# Patient Record
Sex: MALE | Race: BLACK OR AFRICAN AMERICAN | NOT HISPANIC OR LATINO | Employment: UNEMPLOYED | URBAN - METROPOLITAN AREA
[De-identification: names, ages, dates, MRNs, and addresses within clinical notes are randomized per-mention and may not be internally consistent; named-entity substitution may affect disease eponyms.]

---

## 2022-05-03 ENCOUNTER — OFFICE VISIT (OUTPATIENT)
Dept: UROLOGY | Facility: CLINIC | Age: 32
End: 2022-05-03
Payer: COMMERCIAL

## 2022-05-03 VITALS
BODY MASS INDEX: 28.63 KG/M2 | HEIGHT: 70 IN | WEIGHT: 200 LBS | SYSTOLIC BLOOD PRESSURE: 128 MMHG | DIASTOLIC BLOOD PRESSURE: 84 MMHG

## 2022-05-03 DIAGNOSIS — N43.2 OTHER HYDROCELE: Primary | ICD-10-CM

## 2022-05-03 PROCEDURE — 99203 OFFICE O/P NEW LOW 30 MIN: CPT | Performed by: PHYSICIAN ASSISTANT

## 2022-05-03 RX ORDER — IBUPROFEN 600 MG/1
600 TABLET ORAL
COMMUNITY
Start: 2022-04-24

## 2022-05-03 RX ORDER — PERPHENAZINE 16 MG
TABLET ORAL
COMMUNITY

## 2022-05-03 RX ORDER — METHOCARBAMOL 750 MG/1
750 TABLET, FILM COATED ORAL 3 TIMES DAILY PRN
COMMUNITY
Start: 2022-04-24

## 2022-05-03 RX ORDER — DIPHENHYDRAMINE HCL 25 MG
25 CAPSULE ORAL EVERY 6 HOURS PRN
COMMUNITY

## 2022-05-03 RX ORDER — CETIRIZINE HYDROCHLORIDE 10 MG/1
CAPSULE, LIQUID FILLED ORAL
COMMUNITY

## 2022-05-03 RX ORDER — MAGNESIUM GLUCONATE 30 MG(550)
30 TABLET ORAL 2 TIMES DAILY
COMMUNITY

## 2022-05-03 RX ORDER — UBIDECARENONE 50 MG
100 CAPSULE ORAL DAILY
COMMUNITY

## 2022-05-03 NOTE — PROGRESS NOTES
5/3/2022      Chief Complaint   Patient presents with    Hydrocele     Patient was not able to give a urine sample today     Assessment and Plan    28 y o  male new patient     1  Tiny left hydrocele  - US from 4/15/22 showed small left hydrocele, otherwise unremarkable  - Physical exam is unremarkable  - Would not recommend any surgical management at this time  Recommend conservative management including scrotal support / elevation and NSAIDs as needed  - If any severe pain could consider a left spermatic cord block  - Advise patient potentially pain could be musculoskeletal due to lumbar radiculopathy   - F/u in 3-6 months for symptom reassessment  History of Present Illness  Sylwia Hurtado is a 28 y o  male here for new patient evaluation of testicular pain and swelling  He has noticed left testicular swelling for the past several months  He states this began after performing some heavy lifting  He obtain ultrasound which showed a small left hydrocele and otherwise was normal   He recently has been dealing with some lumbar radiculopathy issues and was referred to orthopedist and physical therapy by his PCP  He reports pain is worse when lying flat  Describes the pain as intermittent and is typically no worse than a 4-5/10  He denies any urinary complaints  Review of Systems   Constitutional: Negative for chills and fever  Respiratory: Negative for shortness of breath  Cardiovascular: Negative for chest pain  Gastrointestinal: Negative for abdominal pain  Genitourinary: Positive for scrotal swelling and testicular pain  Negative for difficulty urinating, dysuria, flank pain, frequency, hematuria and urgency  Neurological: Negative for dizziness  Past Medical History  History reviewed  No pertinent past medical history  Past Social History  History reviewed  No pertinent surgical history    Social History     Tobacco Use   Smoking Status Never Smoker   Smokeless Tobacco Never Used Past Family History  History reviewed  No pertinent family history  Past Social history  Social History     Socioeconomic History    Marital status: Single     Spouse name: Not on file    Number of children: Not on file    Years of education: Not on file    Highest education level: Not on file   Occupational History    Not on file   Tobacco Use    Smoking status: Never Smoker    Smokeless tobacco: Never Used   Vaping Use    Vaping Use: Some days   Substance and Sexual Activity    Alcohol use: Not on file    Drug use:  Yes    Sexual activity: Not on file   Other Topics Concern    Not on file   Social History Narrative    Not on file     Social Determinants of Health     Financial Resource Strain: Not on file   Food Insecurity: Not on file   Transportation Needs: Not on file   Physical Activity: Not on file   Stress: Not on file   Social Connections: Not on file   Intimate Partner Violence: Not on file   Housing Stability: Not on file       Current Medications  Current Outpatient Medications   Medication Sig Dispense Refill    ibuprofen (MOTRIN) 600 mg tablet Take 600 mg by mouth 3 (three) times daily after meals      methocarbamol (ROBAXIN) 750 mg tablet Take 750 mg by mouth 3 (three) times a day as needed      Alpha-Lipoic Acid 600 MG CAPS Take by mouth (Patient not taking: Reported on 5/3/2022 )      Cetirizine HCl (ZyrTEC Allergy) 10 MG CAPS  (Patient not taking: Reported on 5/3/2022 )      Cholecalciferol 50 MCG (2000 UT) CAPS Take 1 capsule by mouth (Patient not taking: Reported on 5/3/2022 )      Coenzyme Q10 50 MG CAPS Take 100 mg by mouth daily (Patient not taking: Reported on 5/3/2022 )      diphenhydrAMINE (BENADRYL) 25 mg capsule Take 25 mg by mouth every 6 (six) hours as needed (Patient not taking: Reported on 5/3/2022 )      Docosahexaenoic Acid (DHA OMEGA 3 PO)  (Patient not taking: Reported on 5/3/2022 )      Magnesium Gluconate 550 MG TABS Take 30 mg by mouth 2 (two) times a day (Patient not taking: Reported on 5/3/2022 )      Saw Palmetto, Serenoa repens, (SAW PALMETTO PO) Take by mouth (Patient not taking: Reported on 5/3/2022 )       No current facility-administered medications for this visit  Allergies  Allergies   Allergen Reactions    Castor Oil Angioedema    Penicillins Other (See Comments)     Patient unsure - he was a child when he had a reaction          The following portions of the patient's history were reviewed and updated as appropriate: allergies, current medications, past medical history, past social history, past surgical history and problem list       Vitals  Vitals:    05/03/22 1301   BP: 128/84   BP Location: Left arm   Patient Position: Sitting   Cuff Size: Adult   Weight: 90 7 kg (200 lb)   Height: 5' 10" (1 778 m)           Physical Exam  Physical Exam  Constitutional:       Appearance: Normal appearance  HENT:      Head: Normocephalic and atraumatic  Right Ear: External ear normal       Left Ear: External ear normal    Eyes:      General: No scleral icterus  Conjunctiva/sclera: Conjunctivae normal    Cardiovascular:      Pulses: Normal pulses  Pulmonary:      Effort: Pulmonary effort is normal    Genitourinary:     Penis: Normal        Comments: Testes descended and symmetric  No scrotal erythema or swelling  No obvious hydrocele appreciated on exam  Some diffuse tenderness to left testicle  No intratesticular masses  Musculoskeletal:         General: Normal range of motion  Cervical back: Normal range of motion  Skin:     General: Skin is warm and dry  Neurological:      General: No focal deficit present  Mental Status: He is alert and oriented to person, place, and time  Psychiatric:         Mood and Affect: Mood normal          Behavior: Behavior normal          Thought Content:  Thought content normal          Judgment: Judgment normal            Results  No results found for this or any previous visit (from the past 1 hour(s))  ]  No results found for: PSA  No results found for: GLUCOSE, CALCIUM, NA, K, CO2, CL, BUN, CREATININE  No results found for: WBC, HGB, HCT, MCV, PLT        Orders  No orders of the defined types were placed in this encounter        Gaye Kelly PA-C

## 2024-08-14 ENCOUNTER — OFFICE VISIT (OUTPATIENT)
Age: 34
End: 2024-08-14
Payer: COMMERCIAL

## 2024-08-14 VITALS
HEIGHT: 70 IN | HEART RATE: 68 BPM | OXYGEN SATURATION: 96 % | SYSTOLIC BLOOD PRESSURE: 132 MMHG | DIASTOLIC BLOOD PRESSURE: 84 MMHG | TEMPERATURE: 98.4 F | WEIGHT: 228 LBS | BODY MASS INDEX: 32.64 KG/M2

## 2024-08-14 DIAGNOSIS — L63.9 ALOPECIA AREATA: Primary | ICD-10-CM

## 2024-08-14 PROCEDURE — 99204 OFFICE O/P NEW MOD 45 MIN: CPT | Performed by: DERMATOLOGY

## 2024-08-14 RX ORDER — CLOBETASOL PROPIONATE 0.5 MG/G
AEROSOL, FOAM TOPICAL
Qty: 100 G | Refills: 3 | Status: SHIPPED | OUTPATIENT
Start: 2024-08-14

## 2024-08-14 NOTE — PATIENT INSTRUCTIONS
"ALOPECIA AREATA    Additional History of Present Condition:  Patient presents for hair loss since he was 26. Patient states he does have a family history of Thyroid disease  Negative impact on psychosocial functioning? No  Rapid onset?  No  Preceding illness/viral URI?  No  Personal history of atopy/eczema?  No  Personal or family history of thyroid disorders, atopy/eczema, vitiligo, psoriasis, diabetes mellitus?  yes      Plan:  Discussed that alopecia areata is an autoimmune condition where the immune cells in the body attack the hair follicles, causing hair loss. Alopecia areata may be associated with other autoimmune conditions, including vitiligo and thyroid disease. Workup may include labs to check for specific antibodies and thyroid function.  Discussed potential course of the condition, including potential for hair to grow back on its own. Continue to monitor for changes, including increased patch size or more patches of hair loss developing. Although spontaneous hair regrowth may occur, relapse is common.  Educated that 50% of patients with limited hair loss will recover within a year but many will experience multiple episodes.   Certain patterns of alopecia areata, such as \"ophiasis\" pattern, may be associated with worse clinical outcomes. Discussed that rarely alopecia areata can progress to alopecia totalis (complete loss of hair on the head) or alopecia universalis (complete loss of hair on the scalp and body).   PRESCRIPTION MANAGEMENT:  We discussed that treatment often begins with topical steroids and topical calcineurin inhibitors; topical DELMER-inhibitors may also be useful.  Systemic therapy with oral corticosteroids such as prednisone or DELMER-inhibitors may also be indicated.  Side effects of these medications were discussed.  Tpoical Clobetasol 0.5% foam daily     PROCEDURES PERFORMED TODAY ASSOCIATED WITH THIS CONDITION:          NONE.      MEDICAL DECISION MAKING  Treatment Goal:  Resolution of " the CHRONIC condition.       Chronic condition is NOT at treatment goal.  It is progressing along its expected course OR is poorly-controlled.

## 2024-08-14 NOTE — PROGRESS NOTES
"[facility] Note     Patient Name: Montrell Love  Encounter Date: 8/14/24     Have you been cared for by a [facility]ologist in the last 3 years and, if so, which description applies to you?    NO.   I am considered a \"new\" patient and must complete all patient intake questions. I am MALE/not capable of bearing children.    REVIEW OF SYSTEMS:  Have you recently had or currently have any of the following? Recent fever or chills? No  Any non-healing wound? No   PAST MEDICAL HISTORY:  Have you personally ever had or currently have any of the following?  If \"YES,\" then please provide more detail. Skin cancer (such as Melanoma, Basal Cell Carcinoma, Squamous Cell Carcinoma?  No  Tuberculosis, HIV/AIDS, Hepatitis B or C: No  Radiation Treatment No   HISTORY OF IMMUNOSUPPRESSION:   Do you have a history of any of the following:  Systemic Immunosuppression such as Diabetes, Biologic or Immunotherapy, Chemotherapy, Organ Transplantation, Bone Marrow Transplantation?  No     Answering \"YES\" requires the addition of the dotphrase \"IMMUNOSUPPRESSED\" as the first diagnosis of the patient's visit.   FAMILY HISTORY:  Any \"first degree relatives\" (parent, brother, sister, or child) with the following?    Skin Cancer, Pancreatic or Other Cancer? No   PATIENT EXPERIENCE:    Do you want the Dermatologist to perform a COMPLETE skin exam today including a clinical examination under the \"bra and underwear\" areas?  yes  If necessary, do we have your permission to call and leave a detailed message on your Preferred Phone number that includes your specific medical information?  Yes      Allergies   Allergen Reactions    Castor Oil Angioedema    Penicillins Other (See Comments)     Patient unsure - he was a child when he had a reaction       Current Outpatient Medications:     ibuprofen (MOTRIN) 600 mg tablet, Take 600 mg by mouth 3 (three) times daily after meals, Disp: , Rfl:     methocarbamol (ROBAXIN) 750 mg tablet, " Take 750 mg by mouth 3 (three) times a day as needed, Disp: , Rfl:     NON FORMULARY, Formulation: Dispensary Name: Dispensary Phone Number:, Disp: , Rfl:     Zn-Pyg Afri-Nettle-Saw Palmet (SAW PALMETTO COMPLEX PO), Take 1 tablet by mouth daily, Disp: , Rfl:     Alpha-Lipoic Acid 600 MG CAPS, Take by mouth (Patient not taking: Reported on 5/3/2022), Disp: , Rfl:     Cetirizine HCl (ZyrTEC Allergy) 10 MG CAPS, , Disp: , Rfl:     Cholecalciferol 50 MCG (2000 UT) CAPS, Take 1 capsule by mouth (Patient not taking: Reported on 5/3/2022), Disp: , Rfl:     Coenzyme Q10 50 MG CAPS, Take 100 mg by mouth daily (Patient not taking: Reported on 5/3/2022), Disp: , Rfl:     diphenhydrAMINE (BENADRYL) 25 mg capsule, Take 25 mg by mouth every 6 (six) hours as needed (Patient not taking: Reported on 5/3/2022), Disp: , Rfl:     Docosahexaenoic Acid (DHA OMEGA 3 PO), , Disp: , Rfl:     Magnesium Gluconate 550 MG TABS, Take 30 mg by mouth 2 (two) times a day (Patient not taking: Reported on 5/3/2022), Disp: , Rfl:     Saw Palmetto, Serenoa repens, (SAW PALMETTO PO), Take by mouth (Patient not taking: Reported on 5/3/2022), Disp: , Rfl:           Whom besides the patient is providing clinical information about today's encounter?   NO ADDITIONAL HISTORIAN (patient alone provided history)    Physical Exam and Assessment/Plan by Diagnosis:      ALOPECIA AREATA    Physical Exam:  Anatomic Location: Scalp  Morphologic Description:  Well-demarcated round-oval patch of hair loss  Severity (SALT SCORE):   MILD:  1-20% hair loss of affected areas  Diffuse (multifocal) positive hair pull test consistent with rapidly progressive AA? No  Pertinent Positives:  Exclamation point hairs present under dermoscopy (especially at periphery): YES  Ophiasis pattern? No  Eyebrow and/or eyelash involvement?  No  Enlarged thyroid or nodules palpated? No  Nail pitting or ridging: No  Pertinent Negatives:         Additional History of Present Condition:  Patient  "presents for hair loss since he was 26. Patient states he does have a family history of Thyroid disease  Negative impact on psychosocial functioning? No  Rapid onset?  No  Preceding illness/viral URI?  No  Personal history of atopy/eczema?  No  Personal or family history of thyroid disorders, atopy/eczema, vitiligo, psoriasis, diabetes mellitus?  yes      Plan:  Discussed that alopecia areata is an autoimmune condition where the immune cells in the body attack the hair follicles, causing hair loss. Alopecia areata may be associated with other autoimmune conditions, including vitiligo and thyroid disease. Workup may include labs to check for specific antibodies and thyroid function.  Discussed potential course of the condition, including potential for hair to grow back on its own. Continue to monitor for changes, including increased patch size or more patches of hair loss developing. Although spontaneous hair regrowth may occur, relapse is common.  Educated that 50% of patients with limited hair loss will recover within a year but many will experience multiple episodes.   Certain patterns of alopecia areata, such as \"ophiasis\" pattern, may be associated with worse clinical outcomes. Discussed that rarely alopecia areata can progress to alopecia totalis (complete loss of hair on the head) or alopecia universalis (complete loss of hair on the scalp and body).   PRESCRIPTION MANAGEMENT:  We discussed that treatment often begins with topical steroids and topical calcineurin inhibitors; topical DELMER-inhibitors may also be useful.  Systemic therapy with oral corticosteroids such as prednisone or DELMER-inhibitors may also be indicated.  Side effects of these medications were discussed.  Tpoical Clobetasol 0.5% foam daily     PROCEDURES PERFORMED TODAY ASSOCIATED WITH THIS CONDITION:          NONE.      MEDICAL DECISION MAKING  Treatment Goal:  Resolution of the CHRONIC condition.       Chronic condition is NOT at treatment " goal.  It is progressing along its expected course OR is poorly-controlled.              Scribe Attestation      I,:  Anali Álvarez MA am acting as a scribe while in the presence of the attending physician.:       I,:  Maninder Frias MD personally performed the services described in this documentation    as scribed in my presence.:

## 2025-01-09 ENCOUNTER — OFFICE VISIT (OUTPATIENT)
Age: 35
End: 2025-01-09
Payer: COMMERCIAL

## 2025-01-09 VITALS
HEIGHT: 70 IN | DIASTOLIC BLOOD PRESSURE: 80 MMHG | TEMPERATURE: 98.7 F | WEIGHT: 225 LBS | HEART RATE: 93 BPM | SYSTOLIC BLOOD PRESSURE: 124 MMHG | BODY MASS INDEX: 32.21 KG/M2 | OXYGEN SATURATION: 94 %

## 2025-01-09 DIAGNOSIS — L63.9 ALOPECIA AREATA: Primary | ICD-10-CM

## 2025-01-09 DIAGNOSIS — L85.8 KERATOSIS PILARIS: ICD-10-CM

## 2025-01-09 DIAGNOSIS — L28.2: ICD-10-CM

## 2025-01-09 DIAGNOSIS — B35.3 TINEA PEDIS OF BOTH FEET: ICD-10-CM

## 2025-01-09 PROCEDURE — 11900 INJECT SKIN LESIONS </W 7: CPT | Performed by: STUDENT IN AN ORGANIZED HEALTH CARE EDUCATION/TRAINING PROGRAM

## 2025-01-09 PROCEDURE — 99214 OFFICE O/P EST MOD 30 MIN: CPT | Performed by: STUDENT IN AN ORGANIZED HEALTH CARE EDUCATION/TRAINING PROGRAM

## 2025-01-09 RX ORDER — DOXYCYCLINE 100 MG/1
100 CAPSULE ORAL EVERY 12 HOURS SCHEDULED
Qty: 60 CAPSULE | Refills: 0 | Status: SHIPPED | OUTPATIENT
Start: 2025-01-09 | End: 2025-02-08

## 2025-01-09 RX ORDER — CICLOPIROX OLAMINE 7.7 MG/G
CREAM TOPICAL 2 TIMES DAILY
Qty: 90 G | Refills: 1 | Status: SHIPPED | OUTPATIENT
Start: 2025-01-09

## 2025-01-09 RX ORDER — CLOBETASOL PROPIONATE 0.5 MG/G
AEROSOL, FOAM TOPICAL
Qty: 100 G | Refills: 3 | Status: SHIPPED | OUTPATIENT
Start: 2025-01-09

## 2025-01-09 NOTE — PATIENT INSTRUCTIONS
ALOPECIA AREATA    Physical Exam:  Anatomic Location Affected:  Scalp  Morphological Description:  patches of hair loss    It may be characterized by one or more of the following signs and symptoms:  Patchy hair loss that begins in one or more coin-sized areas on the scalp  A few short hairs that occur in or at the edges of the bare spots that get narrower at the bottom like an exclamation mary ann  Clumps of hair on the pillow or in the shower   Hair loss can occur mostly on the scalp, but can involve eyebrows, eyelashes, and beards  Nail problems such as pitting, white spots, or lines. Rarely, the nails can change shape or fall off    Diagnosis of alopecia areata can usually be made with a good history and careful physical exam. Most patients with alopecia areata are otherwise healthy; screening with blood work may be warranted for those with specific symptoms such as thyroid dysfunction.  The dermatologist will sometimes perform a skin biopsy to confirm the diagnosis.     When alopecia areata occurs in a child, it is important to to give proper support and education about the condition. Providing your child with positive messages and praise is crucial for self-esteem. In many cases, alopecia areata will resolve by itself over the course of several months. Many patients lose their hair more than once before the condition goes away on its own. No one can predict when hair might re-grow or fall out again. Treatment can help the hair regrow more quickly.     Some specific treatment options include:  Corticosteroids in the form of strong topical creams and ointments. These are applied to bare spots and surrounding skin. Steroid injections work better than topical medciations and may be used in older children who can tolerate needles.   Patients receive shots ever 3 to 6 weeks, with hair growth generally beginning 4 weeks after the last shot  Minoxidil 5% is a hair regrowth medicine that is applied twice a day to the  scalp, brows, or beard. It generally takes about 3 months to begin working.  Anthralin is a tar-like substance that alters the skin's immune function. It is left on the skin for 20 to 60 minutes and then washed off to avoid irritation.  Diphencyprone can be used when other treatments fail. It causes a small allergic reaction when applied to bare skin, which tricks the immune system into fighting the inflammation on the scalp. This method can take up to three months for the hair to start re-growing.     KERATOSIS PILARIS    Physical Exam:  Anatomic Location Affected: Bilateral Legs  Assessment and Plan:  Based on a thorough discussion of this condition and the management approach to it (including a comprehensive discussion of the known risks, side effects and potential benefits of treatment), the patient (family) agrees to implement the following specific plan:  Advised Over the counter Amlactin Cream or Salicylic Acid Cream    Keratosis pilaris is a very common condition that appears as tiny bumps on the skin that may look like goosebumps or small pimples. These bumps are composed of small plugs of dead skin cells and are most commonly found over the upper arms and thighs. Children may also find bumps on their cheeks. Keratosis pilaris is harmless and affects up to half of normal children and up to three quarters of children who have ichthyosis vulgaris (a dry skin condition due to filaggrin gene mutations). It is also more common in children with atopic eczema.     Common symptoms of keratosis pilaris begin before age 2 or during the teenage years.    Bumps over the outer aspect of upper arms and thighs symmetrically that feel like sandpaper  Potentially over buttocks, sides of cheeks, forearms, and upper back  Scaly, skin colored or red spots in keratosis pilaris rubra  Non-painful, but potential to be itchy     Keratosis pilaris is caused by abnormal growth of skin cells lining the upper portion of the hair  "follicles. Instead of naturally sloughing off, scaly skin will pile up and fill the follicle. There is a strong association with genetics, meaning that the condition has a high chance of being inherited if one or both parents are affected. It can also occur as a side effect of cancer therapies such as vemurafenib.     Treating dry skin often helps as dry skin can cause the bumps to be more noticeable. Many people notice that the bumps disappear in the summer months when there is more moisture in the air. Sometimes, keratosis pilaris fades as one grows older, but puberty and hormonal therapy can cause flare-ups. If itch, dryness, or appearance bother you, treatment options include:  Using non-soap cleansers to minimize dryness of the skin   Exfoliation in the shower using a pumice stone or exfoliating sponge  Ammonium lactate cream or lotion (12%)   A moisturizing cream or ointment applied at least 2-3x a day and after bathing   Creams containing urea or lactic acid are especially helpful   Other medicines that remove dead skin cells can also be effective   Alpha hydroxyl acid  Glycolic acid  Retinoids (adapalene, retinol, tazarotene, trentinoin)  Salicylic acid  Pulse dye laser treatments or intense pulsed light can reduce redness temporarily, but not roughness   Laser assisted hair removal       TINEA PEDIS (\"ATHLETE'S FOOT\")    Physical Exam:  Anatomic Location Affected:  Toe web spaces  Morphological Description:  maceration     Assessment and Plan:  Based on a thorough discussion of this condition and the management approach to it (including a comprehensive discussion of the known risks, side effects and potential benefits of treatment), the patient (family) agrees to implement the following specific plan:  Ciclopirox Cream apply to feet twice a day    Tinea Pedis  Tinea pedis is a fungal infection of the foot and is in fact the most common fungal infection. Tinea pedis is caused by dermatophyte fungi with the " "three most common being Trichophyton (T.) rubrum, T. interdigitale and Epidermophyton floccosum.    Tinea pedis most commonly involves the interdigital spaces, known as \"athlete's foot.\" Other typical sites include the toenails, groin, and palms of the hands.  There are four major manifestations of tinea pedis including chronic hyperkeratotic, chronic intertriginous, acute ulcerative and vesicobullous. Signs and symptoms include:   Itchiness, redness, and scaling between the toes  Scales covering the soles and sides of the feet  Blisters over the inner aspect of the feet    It is particularly common in hot, tropical, and urban environments where sweating in the feet facilitate fungal growth. Risk factors for development include:  Occlusive footwear  Excessive swearing  Diabetes or other underlying immunosuppression   Poor peripheral circulation     The diagnosis of tinea pedis can usually be made via good history and physical exam due to its characteristic clinical features. Diagnosis can be confirmed by examining skin scrapings under the microscope. Cultures are occasionally done but may not be necessary if fungi are seen under microscopy.     Other diagnoses to consider if patients do not respond to therapy include psoriasis, contact dermatitis, and eczema.    Tinea pedis can be treated with topical antifungal drugs applied to affected areas on a repeated basis (usually 2 twice a day) for 2 to 4 weeks. Common topical medications include topical ketoconazole, allylamines, butenafine, ciclopirox, and tolnaftate.  In cases that do not respond to topical therapy, oral antifungal agents may be used which include terbinafine, itraconazole, fluconazole and griseofulvin. These oral agents are also used to treat tinea capitis (fungal infection of the scalp) and onychomycosis (fungal infection of the nails).  Those with pre-existing liver problems are usually screened for liver function prior to starting oral terbinafine. "     Tinea pedis can be prevented by making sure feet are clean and dry with protective footwear worn in communal facilities. Other recommendations are:  Using drying foot powders when wearing occlusive shoes   Thoroughly dry shoes and boots prior to wearing them   Making sure to clean contaminated bathroom floors with bleach   Treatment of family members and other close contacts    PRURIGO PIGMENTOSA     Physical Exam:  (Anatomic Location); (Size and Morphological Description); (Differential Diagnosis):  Back; hyperpigmented nodules  Pertinent Positives:  Pertinent Negatives:     Additional History of Present Condition:  Patient follows Keto diet     Assessment and Plan:  Based on a thorough discussion of this condition and the management approach to it (including a comprehensive discussion of the known risks, side effects and potential benefits of treatment), the patient (family) agrees to implement the following specific plan:  Doxycycline 100mg twice daily x3 months

## 2025-01-09 NOTE — PROGRESS NOTES
"[facility] Dermatology Clinic Note     Patient Name: Montrell Love  Encounter Date: January 9, 2025     Have you been cared for by a [facility] Dermatologist in the last 3 years and, if so, which description applies to you?    Yes.  I have been here within the last 3 years, and my medical history has NOT changed since that time.  I am MALE/not capable of bearing children.    REVIEW OF SYSTEMS:  Have you recently had or currently have any of the following? No changes in my recent health.   PAST MEDICAL HISTORY:  Have you personally ever had or currently have any of the following?  If \"YES,\" then please provide more detail. No changes in my medical history.   HISTORY OF IMMUNOSUPPRESSION: Do you have a history of any of the following:  Systemic Immunosuppression such as Diabetes, Biologic or Immunotherapy, Chemotherapy, Organ Transplantation, Bone Marrow Transplantation or Prednisone?  No     Answering \"YES\" requires the addition of the dotphrase \"IMMUNOSUPPRESSED\" as the first diagnosis of the patient's visit.   FAMILY HISTORY:  Any \"first degree relatives\" (parent, brother, sister, or child) with the following?    No changes in my family's known health.   PATIENT EXPERIENCE:    Do you want the Dermatologist to perform a COMPLETE skin exam today including a clinical examination under the \"bra and underwear\" areas?  NO  If necessary, do we have your permission to call and leave a detailed message on your Preferred Phone number that includes your specific medical information?  Yes      Allergies   Allergen Reactions   • Castor Oil Angioedema   • Penicillins Other (See Comments)     Patient unsure - he was a child when he had a reaction       Current Outpatient Medications:   •  Alpha-Lipoic Acid 600 MG CAPS, Take by mouth, Disp: , Rfl:   •  Cetirizine HCl (ZyrTEC Allergy) 10 MG CAPS, , Disp: , Rfl:   •  Cholecalciferol 50 MCG (2000 UT) CAPS, Take 1 capsule by mouth, Disp: , Rfl:   •  ciclopirox (LOPROX) 0.77 % cream, " Apply topically 2 (two) times a day In between toes., Disp: 90 g, Rfl: 1  •  clobetasol (OLUX) 0.05 % topical foam, Apply once daily to scalp every other day to areas of hair loss., Disp: 100 g, Rfl: 3  •  Coenzyme Q10 50 MG CAPS, Take 100 mg by mouth daily, Disp: , Rfl:   •  diphenhydrAMINE (BENADRYL) 25 mg capsule, Take 25 mg by mouth every 6 (six) hours as needed, Disp: , Rfl:   •  Docosahexaenoic Acid (DHA OMEGA 3 PO), , Disp: , Rfl:   •  doxycycline hyclate (VIBRAMYCIN) 100 mg capsule, Take 1 capsule (100 mg total) by mouth every 12 (twelve) hours, Disp: 60 capsule, Rfl: 0  •  ibuprofen (MOTRIN) 600 mg tablet, Take 600 mg by mouth 3 (three) times daily after meals, Disp: , Rfl:   •  Magnesium Gluconate 550 MG TABS, Take 30 mg by mouth 2 (two) times a day, Disp: , Rfl:   •  methocarbamol (ROBAXIN) 750 mg tablet, Take 750 mg by mouth 3 (three) times a day as needed, Disp: , Rfl:   •  NON FORMULARY, Formulation: Dispensary Name: Dispensary Phone Number:, Disp: , Rfl:   •  Saw Palmetto, Serenoa repens, (SAW PALMETTO PO), Take by mouth, Disp: , Rfl:   •  Zn-Pyg Afri-Nettle-Saw Palmet (SAW PALMETTO COMPLEX PO), Take 1 tablet by mouth daily, Disp: , Rfl:   No current facility-administered medications for this visit.          Whom besides the patient is providing clinical information about today's encounter?   NO ADDITIONAL HISTORIAN (patient alone provided history)    Physical Exam and Assessment/Plan by Diagnosis:    Chief complaint: Patient is a 33 y/o male present for follow up of Alopecia Areata on the Scalp, was using Clobetasol Foam which seemed to be helping but finished refills. He also was using a Onion and Coconut Oil puree for the scalp. (2) dark spots on the back, chest and legs. Pt states spots on back started after intercourse with someone and Urgent care did not think it was any kind of STD. (3) white patches between toe web spaces which started 4 months ago.    ALOPECIA AREATA    Physical  Exam:  Anatomic Location Affected:  Scalp  Morphological Description:  focal patches of non-scarring hair loss  Pertinent Positives:  Pertinent Negatives:    Additional History of Present Condition:  completed 3 month course of Clobetasol Foam. Patient noted improvement with topicals but ran out of medication. His mom also had this condition and had success with intralesional kenalog injections. Patient is asking about intralesional kenalog to treat his AA.     Assessment and Plan:  Based on a thorough discussion of this condition and the management approach to it (including a comprehensive discussion of the known risks, side effects and potential benefits of treatment), the patient (family) agrees to implement the following specific plan:  Continue Clobetasol Foam daily on scalp  PROCEDURE:  INTRALESIONAL STEROID INJECTION (KENALOG INJECTION)    Purpose: Triamcinolone is a synthetic glucocorticoid corticosteroid that has marked anti-inflammatory action. It is prepared in sterile aqueous suspension suitable for injecting directly into a lesion on or immediately below the skin to treat a dermal inflammatory process.     Indications: It is indicated for alopecia areata; inflammatory acne cysts; discoid lupus erythematosus; keloids and hypertrophic scars; inflammatory lesions of granuloma annulare, lichen planus, lichen simplex chronicus (neurodermatitis), psoriatic plaques, and other localized inflammatory skin conditions.     Potential Side Effects: I understand that triamcinolone injection can potentially cause early and/or delayed adverse effects such as:   • Pain   • Impaired wound healing   • Increased hair growth   • Bleeding   • White or brown marks   • Steroid acne   • Infection   • Telangiectasia   • Skin thinning   • Cutaneous and subcutaneous lipoatrophy (most common) appearing as skin indentations or dimples around the injection sites a few weeks after treatment     PROCEDURE NOTE:  After verbal and  written consent were obtained, the to-be-treated area was wiped and cleaned with rubbing alcohol 70%.      Then, a total of 2.0 mL of Kenalog CONCENTRATION:  10 mg/mL   (Lot# [id number]; Expiration 12/30/2025, NDC#: 4160-5987-41) was injected intralesionally into a total of 4 patches on the following anatomic areas:  Front, Mid and Posterior Scalp all Midline,  using a 1-mL syringe and a 30 1/2-gauge needle.      There was less than 1 mL of blood loss and little to no discomfort.  The area was bandaged with a Band-aid.  The patient tolerated the procedure well and remained in the office for observation.  With no signs of an adverse reaction, the patient was eventually discharged from clinic.        It may be characterized by one or more of the following signs and symptoms:  Patchy hair loss that begins in one or more coin-sized areas on the scalp  A few short hairs that occur in or at the edges of the bare spots that get narrower at the bottom like an exclamation mary ann  Clumps of hair on the pillow or in the shower   Hair loss can occur mostly on the scalp, but can involve eyebrows, eyelashes, and beards  Nail problems such as pitting, white spots, or lines. Rarely, the nails can change shape or fall off    Diagnosis of alopecia areata can usually be made with a good history and careful physical exam. Most patients with alopecia areata are otherwise healthy; screening with blood work may be warranted for those with specific symptoms such as thyroid dysfunction.  The dermatologist will sometimes perform a skin biopsy to confirm the diagnosis.     When alopecia areata occurs in a child, it is important to to give proper support and education about the condition. Providing your child with positive messages and praise is crucial for self-esteem. In many cases, alopecia areata will resolve by itself over the course of several months. Many patients lose their hair more than once before the condition goes away on its own. No  one can predict when hair might re-grow or fall out again. Treatment can help the hair regrow more quickly.     Some specific treatment options include:  Corticosteroids in the form of strong topical creams and ointments. These are applied to bare spots and surrounding skin. Steroid injections work better than topical medciations and may be used in older children who can tolerate needles.   Patients receive shots ever 3 to 6 weeks, with hair growth generally beginning 4 weeks after the last shot  Minoxidil 5% is a hair regrowth medicine that is applied twice a day to the scalp, brows, or beard. It generally takes about 3 months to begin working.  Anthralin is a tar-like substance that alters the skin's immune function. It is left on the skin for 20 to 60 minutes and then washed off to avoid irritation.  Diphencyprone can be used when other treatments fail. It causes a small allergic reaction when applied to bare skin, which tricks the immune system into fighting the inflammation on the scalp. This method can take up to three months for the hair to start re-growing.     KERATOSIS PILARIS    Physical Exam:  Anatomic Location Affected: Bilateral Legs  Morphological Description:  1-2mm sumeet-follicular pinkish-red papules   Pertinent Positives:  Pertinent Negatives:    Additional History of Present Condition:  present on exam    Assessment and Plan:  Based on a thorough discussion of this condition and the management approach to it (including a comprehensive discussion of the known risks, side effects and potential benefits of treatment), the patient (family) agrees to implement the following specific plan:  Advised Over the counter Amlactin Cream or Salicylic Acid Cream    Keratosis pilaris is a very common condition that appears as tiny bumps on the skin that may look like goosebumps or small pimples. These bumps are composed of small plugs of dead skin cells and are most commonly found over the upper arms and thighs.  Children may also find bumps on their cheeks. Keratosis pilaris is harmless and affects up to half of normal children and up to three quarters of children who have ichthyosis vulgaris (a dry skin condition due to filaggrin gene mutations). It is also more common in children with atopic eczema.     Common symptoms of keratosis pilaris begin before age 2 or during the teenage years.    Bumps over the outer aspect of upper arms and thighs symmetrically that feel like sandpaper  Potentially over buttocks, sides of cheeks, forearms, and upper back  Scaly, skin colored or red spots in keratosis pilaris rubra  Non-painful, but potential to be itchy     Keratosis pilaris is caused by abnormal growth of skin cells lining the upper portion of the hair follicles. Instead of naturally sloughing off, scaly skin will pile up and fill the follicle. There is a strong association with genetics, meaning that the condition has a high chance of being inherited if one or both parents are affected. It can also occur as a side effect of cancer therapies such as vemurafenib.     Treating dry skin often helps as dry skin can cause the bumps to be more noticeable. Many people notice that the bumps disappear in the summer months when there is more moisture in the air. Sometimes, keratosis pilaris fades as one grows older, but puberty and hormonal therapy can cause flare-ups. If itch, dryness, or appearance bother you, treatment options include:  Using non-soap cleansers to minimize dryness of the skin   Exfoliation in the shower using a pumice stone or exfoliating sponge  Ammonium lactate cream or lotion (12%)   A moisturizing cream or ointment applied at least 2-3x a day and after bathing   Creams containing urea or lactic acid are especially helpful   Other medicines that remove dead skin cells can also be effective   Alpha hydroxyl acid  Glycolic acid  Retinoids (adapalene, retinol, tazarotene, trentinoin)  Salicylic acid  Pulse dye laser  "treatments or intense pulsed light can reduce redness temporarily, but not roughness   Laser assisted hair removal     TINEA PEDIS (\"ATHLETE'S FOOT\")    Physical Exam:  Anatomic Location Affected:  Toe web spaces  Morphological Description:  macerated plaques   Pertinent Positives:  Pertinent Negatives:    Additional History of Present Condition:  present on exam    Assessment and Plan:  Based on a thorough discussion of this condition and the management approach to it (including a comprehensive discussion of the known risks, side effects and potential benefits of treatment), the patient (family) agrees to implement the following specific plan:  Ciclopirox Cream apply to feet twice a day    Tinea Pedis  Tinea pedis is a fungal infection of the foot and is in fact the most common fungal infection. Tinea pedis is caused by dermatophyte fungi with the three most common being Trichophyton (T.) rubrum, T. interdigitale and Epidermophyton floccosum.    Tinea pedis most commonly involves the interdigital spaces, known as \"athlete's foot.\" Other typical sites include the toenails, groin, and palms of the hands.  There are four major manifestations of tinea pedis including chronic hyperkeratotic, chronic intertriginous, acute ulcerative and vesicobullous. Signs and symptoms include:   Itchiness, redness, and scaling between the toes  Scales covering the soles and sides of the feet  Blisters over the inner aspect of the feet    It is particularly common in hot, tropical, and urban environments where sweating in the feet facilitate fungal growth. Risk factors for development include:  Occlusive footwear  Excessive swearing  Diabetes or other underlying immunosuppression   Poor peripheral circulation     The diagnosis of tinea pedis can usually be made via good history and physical exam due to its characteristic clinical features. Diagnosis can be confirmed by examining skin scrapings under the microscope. Cultures are " occasionally done but may not be necessary if fungi are seen under microscopy.     Other diagnoses to consider if patients do not respond to therapy include psoriasis, contact dermatitis, and eczema.    Tinea pedis can be treated with topical antifungal drugs applied to affected areas on a repeated basis (usually 2 twice a day) for 2 to 4 weeks. Common topical medications include topical ketoconazole, allylamines, butenafine, ciclopirox, and tolnaftate.  In cases that do not respond to topical therapy, oral antifungal agents may be used which include terbinafine, itraconazole, fluconazole and griseofulvin. These oral agents are also used to treat tinea capitis (fungal infection of the scalp) and onychomycosis (fungal infection of the nails).  Those with pre-existing liver problems are usually screened for liver function prior to starting oral terbinafine.     Tinea pedis can be prevented by making sure feet are clean and dry with protective footwear worn in communal facilities. Other recommendations are:  Using drying foot powders when wearing occlusive shoes   Thoroughly dry shoes and boots prior to wearing them   Making sure to clean contaminated bathroom floors with bleach   Treatment of family members and other close contacts    PRURIGO PIGMENTOSA    Physical Exam:  (Anatomic Location); (Size and Morphological Description); (Differential Diagnosis):  Back; hyperpigmented nodules  Pertinent Positives:  Pertinent Negatives:    Additional History of Present Condition:  Patient follows Keto diet    Assessment and Plan:  Based on a thorough discussion of this condition and the management approach to it (including a comprehensive discussion of the known risks, side effects and potential benefits of treatment), the patient (family) agrees to implement the following specific plan:  Doxycycline 100mg twice daily for 1 month     Scribe Attestation    I,:  Margie Raines MA am acting as a scribe while in the presence of  the attending physician.:       I,:  Cali Finley DO personally performed the services described in this documentation    as scribed in my presence.:

## 2025-02-05 ENCOUNTER — TELEPHONE (OUTPATIENT)
Age: 35
End: 2025-02-05

## 2025-02-05 NOTE — TELEPHONE ENCOUNTER
Pt calling in requesting itemized bill for visit. Encouraged pt to contact billing at [phone]. No other questions at this time.

## 2025-04-17 ENCOUNTER — TELEPHONE (OUTPATIENT)
Age: 35
End: 2025-04-17

## 2025-04-17 NOTE — TELEPHONE ENCOUNTER
Patient called asking if we can waive the co pay for his today's apt.   Teams sent to [facility]/no reply within the given time to keep patient on hold.  Please look into this and advise.   Patient stated he will call back at 1 and asked for no one to call him before 1 please.  Thank you

## 2025-04-17 NOTE — TELEPHONE ENCOUNTER
"[facility] advised Via Teams:   \"The provider does not have the ability to waive copayment that is an agreement patient has with his insurance company. I think technically it is against the law. IF they cannot pay the copay they can be billed but a copayment cannot be waived unfortunately\"  "

## 2025-04-17 NOTE — TELEPHONE ENCOUNTER
Patient called stating that he spoke to someone and was informed that the co pay can't be waived and now is asking for a hardship program   Provided patient with the billing # /placed a message in teams to see if I should direct his call any other way.